# Patient Record
Sex: FEMALE
[De-identification: names, ages, dates, MRNs, and addresses within clinical notes are randomized per-mention and may not be internally consistent; named-entity substitution may affect disease eponyms.]

---

## 2019-06-06 ENCOUNTER — HOSPITAL ENCOUNTER (OUTPATIENT)
Dept: HOSPITAL 39 - RESP | Age: 69
End: 2019-06-06
Attending: FAMILY MEDICINE
Payer: COMMERCIAL

## 2019-06-06 DIAGNOSIS — R00.1: Primary | ICD-10-CM

## 2020-05-15 ENCOUNTER — HOSPITAL ENCOUNTER (OUTPATIENT)
Dept: HOSPITAL 39 - ER | Age: 70
Setting detail: OBSERVATION
LOS: 1 days | Discharge: HOME | End: 2020-05-16
Attending: NURSE PRACTITIONER | Admitting: NURSE PRACTITIONER
Payer: COMMERCIAL

## 2020-05-15 DIAGNOSIS — E11.9: ICD-10-CM

## 2020-05-15 DIAGNOSIS — Z79.82: ICD-10-CM

## 2020-05-15 DIAGNOSIS — Z83.3: ICD-10-CM

## 2020-05-15 DIAGNOSIS — Z60.2: ICD-10-CM

## 2020-05-15 DIAGNOSIS — Z80.0: ICD-10-CM

## 2020-05-15 DIAGNOSIS — R07.89: Primary | ICD-10-CM

## 2020-05-15 DIAGNOSIS — Z82.49: ICD-10-CM

## 2020-05-15 DIAGNOSIS — I70.0: ICD-10-CM

## 2020-05-15 DIAGNOSIS — Z82.3: ICD-10-CM

## 2020-05-15 DIAGNOSIS — Z79.899: ICD-10-CM

## 2020-05-15 DIAGNOSIS — I10: ICD-10-CM

## 2020-05-15 DIAGNOSIS — Z79.84: ICD-10-CM

## 2020-05-15 DIAGNOSIS — Z96.651: ICD-10-CM

## 2020-05-15 DIAGNOSIS — R79.89: ICD-10-CM

## 2020-05-15 DIAGNOSIS — E66.9: ICD-10-CM

## 2020-05-15 DIAGNOSIS — Z90.49: ICD-10-CM

## 2020-05-15 DIAGNOSIS — I49.3: ICD-10-CM

## 2020-05-15 DIAGNOSIS — F41.9: ICD-10-CM

## 2020-05-15 DIAGNOSIS — I25.10: ICD-10-CM

## 2020-05-15 DIAGNOSIS — F32.9: ICD-10-CM

## 2020-05-15 PROCEDURE — 80053 COMPREHEN METABOLIC PANEL: CPT

## 2020-05-15 PROCEDURE — 71275 CT ANGIOGRAPHY CHEST: CPT

## 2020-05-15 PROCEDURE — 71045 X-RAY EXAM CHEST 1 VIEW: CPT

## 2020-05-15 PROCEDURE — 96372 THER/PROPH/DIAG INJ SC/IM: CPT

## 2020-05-15 PROCEDURE — 99285 EMERGENCY DEPT VISIT HI MDM: CPT

## 2020-05-15 PROCEDURE — 82948 REAGENT STRIP/BLOOD GLUCOSE: CPT

## 2020-05-15 PROCEDURE — 84484 ASSAY OF TROPONIN QUANT: CPT

## 2020-05-15 PROCEDURE — 96374 THER/PROPH/DIAG INJ IV PUSH: CPT

## 2020-05-15 PROCEDURE — 94762 N-INVAS EAR/PLS OXIMTRY CONT: CPT

## 2020-05-15 PROCEDURE — 85379 FIBRIN DEGRADATION QUANT: CPT

## 2020-05-15 PROCEDURE — 80061 LIPID PANEL: CPT

## 2020-05-15 PROCEDURE — 36416 COLLJ CAPILLARY BLOOD SPEC: CPT

## 2020-05-15 PROCEDURE — 94760 N-INVAS EAR/PLS OXIMETRY 1: CPT

## 2020-05-15 PROCEDURE — 36415 COLL VENOUS BLD VENIPUNCTURE: CPT

## 2020-05-15 PROCEDURE — 93005 ELECTROCARDIOGRAM TRACING: CPT

## 2020-05-15 PROCEDURE — 83880 ASSAY OF NATRIURETIC PEPTIDE: CPT

## 2020-05-15 PROCEDURE — 85025 COMPLETE CBC W/AUTO DIFF WBC: CPT

## 2020-05-15 SDOH — SOCIAL STABILITY - SOCIAL INSECURITY: PROBLEMS RELATED TO LIVING ALONE: Z60.2

## 2020-05-15 NOTE — CT
PROCEDURE:  CTA Chest



CLINICAL HISTORY:  70 years  Female  rule out pulmonary embolism 



COMPARISON:  None.



TECHNIQUE: Contiguous axial images were obtained through the

chest during the infusion of IV contrast. Reformatted images

obtained. MIP reformatted images obtained.   



This exam was performed according to our department optimization

program which includes automated exposure control, adjustment of

the mA and/or kv according to patient size and/or use of

iterative reconstruction technique.



FINDINGS:



Aorta is calcified.

Mild cardiac enlargement.

Calcification of the coronary arteries.

No evidence of aortic dissection or pulmonary embolus.

Fatty liver.

No significant thoracic adenopathy. Focal areas of mosaic

attenuation are noted.



IMPRESSION:No evidence of pulmonary embolus



Cardiac enlargement



No evidence of acute process in the chest



Fatty liver



Electronically signed by:  Barbara Leavitt MD  5/15/2020 6:39 PM

CDT Workstation: [id number]

## 2020-05-15 NOTE — RAD
EXAM DESCRIPTION:  Chest,1 View



CLINICAL HISTORY:  70 years  Female  Chest pain



COMPARISON:  11/19/2019.



FINDINGS: 



The cardiomediastinal silhouette appears unremarkable.

Atherosclerotic calcifications in the thoracic aorta.  

Mild elevation of the right hemidiaphragm.

No consolidating infiltrates or pleural effusions.

No pneumothorax.



IMPRESSION: 



No acute abnormality is identified.



Electronically signed by:  Bradley Leavitt MD  5/15/2020 5:32 PM CDT

Workstation: [id number]

## 2020-05-15 NOTE — ED.PDOC
History of Present Illness





- General


Time Seen by Provider: 05/15/20 16:53


Additional Information: 


This is a 70-year-old female, with a diabetes obesity history of coronary artery

disease has had a stress test less than a year, patient presents to the ER 

because of a two-week history of chest tightness, pain comes and goes but is 

getting progressively worse even at rest, patient said that it feels like is 

going to her neck and today she felt dizzy.  No nausea no vomiting no 

diaphoresis no shortness of breath, patient mentioned that when she takes a deep

breath he feels like it hurts, patient was supposed to see a cardiologist have 

been trying to make an appointment with his cardiologist is in [place] but was 

unsuccessful in getting an appointment, because she got dizzy she decided to 

come in and get herself checked





- History of Present Illness


Timing/Duration: other - 2 weeks 


Location: central


Chest Pain Radiation: neck


Activities at Onset: none


Prior Chest Pain/Cardiac Workup: cardiac cath, stress test


Improving Factors: nothing


Worsening Factors: nothing


Aspirin Treatment Today: 81 mg x 2


Associated Symptoms: dizziness


Allergies/Adverse Reactions: 


Allergies





NO KNOWN ALLERGY Allergy (Verified 05/15/20 17:03)


   





Home Medications: 


Ambulatory Orders





Amlodipine Besylate 5 mg PO DAILY 05/15/20 


Aspirin [Aspirin Adult Low Dose] 162 mg PO DAILY 05/15/20 


Hydroxyzine HCl 25 mg PO TID PRN 05/15/20 


Lisinopril 40 mg PO DAILY 05/15/20 


Metformin HCl [Glucophage] 1,000 mg PO BID 05/15/20 


Omeprazole 40 mg PO DAILY 05/15/20 


Pravastatin Sodium 40 mg PO DAILY 05/15/20 


Verapamil HCl [Verapamil Hydrochloride] 120 mg PO DAILY 05/15/20 











Review of Systems





- Review of Systems


Constitutional: States: no symptoms reported


Respiratory: States: no symptoms reported


Cardiology: States: chest pain


Gastrointestinal/Abdominal: States: no symptoms reported


Genitourinary: States: no symptoms reported


Musculoskeletal: States: no symptoms reported


Skin: States: no symptoms reported


Neurological: States: no symptoms reported


Endocrine: States: no symptoms reported


Hematologic/Lymphatic: States: no symptoms reported





Family Medical History





- Family History


  ** Mother


Family History: Unknown


Living Status: Unknown





Physical Exam





- Physical Exam


General Appearance: Alert, Well Developed, Well Groomed, Well Hydrated, Well 

Nourished, Other - Obese


Eyes, Ears, Nose, Throat Exam: PERRL/EOMI, normal ENT inspection, TMs normal


Neck: non-tender, full range of motion, supple, normal inspection


Respiratory: chest non-tender, lungs clear, normal breath sounds, no respiratory

distress, no accessory muscle use


Cardiovascular/Chest: normal peripheral pulses, regular rate, rhythm, no edema, 

no gallop, no JVD, no murmur


Gastrointestinal/Abdominal: normal bowel sounds, non tender, soft, no 

organomegaly, no pulsatile mass, abnormal bowel sounds


Extremity: normal range of motion, non-tender, normal inspection, no pedal 

edema, no calf tenderness, normal capillary refill


Neurologic: CNs II-XII nml as tested, no motor/sensory deficits, alert, normal 

mood/affect, oriented x 3


Skin Exam: normal color





Progress





- Progress


Progress: 


EXAM DESCRIPTION: Chest,1 View  CLINICAL HISTORY: 70 years Female Chest pain  

COMPARISON: 11/19/2019.  FINDINGS:  The cardiomediastinal silhouette appears 

unremarkable. Atherosclerotic calcifications in the thoracic aorta. Mild 

elevation of the right hemidiaphragm. No consolidating infiltrates or pleural 

effusions. No pneumothorax.  IMPRESSION:  No acute abnormality is identified


05/15/20 18:15





05/15/20 18:44


FINDINGS:  Aorta is calcified. Mild cardiac enlargement. Calcification of the 

coronary arteries. No evidence of aortic dissection or pulmonary embolus. Fatty 

liver. No significant thoracic adenopathy. Focal areas of mosaic attenuation are

noted.  IMPRESSION:No evidence of pulmonary embolus  Cardiac enlargement  No 

evidence of acute process in the chest








This is a 70-year-old female, presenting with chest pain, has been going on for 

a week getting progressively worse patient EKG did not show any acute ischemic 

changes, patient chest pain got better with aspirin and nitro first set of 

troponins , Second set of troponins were also negative, CTA was ordered because 

her D-dimers were elevated and CTA showed calcification of the coronaries 

without evidence of pneumonia or evidence of pulmonary embolism


I spoke with the cardiologist Dr. Hunter and patient can stay in the hospital 

for observation and and also patient should be discharged tomorrow with some 

sort of a beta-blocker due to her blood pressure and some nitro for the 

treatment of angina, and the patient should follow-up with her own cardiologist 

once she is discharged





- Results/Orders


Results/Orders: 





Patient EKG showed PVC normal sinus rhythm no ST elevation or depression left 

atrial enlargement left ventricular hypertrophy





Departure





- Departure


Clinical Impression: 


Chest pain


Qualifiers:


 Chest pain type: unspecified Qualified Code(s): R07.9 - Chest pain, unspecified





Disposition: Admit Patient


Referrals: 


JACINTO DOMINGUEZ [Primary Care Provider] - 1-2 Weeks


Home Medications: 


Ambulatory Orders





Amlodipine Besylate 5 mg PO DAILY 05/15/20 


Aspirin [Aspirin Adult Low Dose] 162 mg PO DAILY 05/15/20 


Hydroxyzine HCl 25 mg PO TID PRN 05/15/20 


Lisinopril 40 mg PO DAILY 05/15/20 


Metformin HCl [Glucophage] 1,000 mg PO BID 05/15/20 


Omeprazole 40 mg PO DAILY 05/15/20 


Pravastatin Sodium 40 mg PO DAILY 05/15/20 


Verapamil HCl [Verapamil Hydrochloride] 120 mg PO DAILY 05/15/20 











Decision To Admit





- Decistion To Admit


Decision to Admit Reason: Admit from ER


Decision to Admit Date: 05/15/20


Decision to Admit Time: 19:03

## 2020-05-16 VITALS — TEMPERATURE: 98.2 F | OXYGEN SATURATION: 97 % | SYSTOLIC BLOOD PRESSURE: 105 MMHG | DIASTOLIC BLOOD PRESSURE: 69 MMHG

## 2020-05-16 RX ADMIN — ACETAMINOPHEN PRN MG: 325 TABLET ORAL at 01:05

## 2020-05-16 RX ADMIN — LISINOPRIL SCH MG: 10 TABLET ORAL at 09:34

## 2020-05-16 RX ADMIN — LISINOPRIL SCH: 10 TABLET ORAL at 09:54

## 2020-05-16 RX ADMIN — INSULIN LISPRO SCH: 100 INJECTION, SOLUTION INTRAVENOUS; SUBCUTANEOUS at 07:36

## 2020-05-16 RX ADMIN — INSULIN LISPRO SCH: 100 INJECTION, SOLUTION INTRAVENOUS; SUBCUTANEOUS at 11:21

## 2020-05-16 RX ADMIN — ACETAMINOPHEN PRN MG: 325 TABLET ORAL at 12:51

## 2020-05-16 NOTE — SSS
SUPERVISING PHYSICIAN:    Michael Kathleen MD



CHIEF COMPLAINT:  Chest pain.



HISTORY OF PRESENT ILLNESS:   Ms. Ball is a 70 year-old  female with a 
history of diabetes, obesity, coronary artery disease.  She presented to the 
Emergency Room complaining of chest tightness that has been going on for the 
last two weeks and has progressively worsened she notes at rest.  She denied any
nausea or vomiting or shortness of breath or diaphoresis. She notes it does hurt
a little bit when she takes a deep breath.  She was supposed to see her 
cardiologist the first of the month but due to the COVID-19 situation did not 
make that appointment.  She does see Dr. Torres in [place] and apparently has 
had a known cath and stress test workup but needs additional intervention at 
some point.  I believe she is supposed to see her cardiologist on May 26.  She 
noted at home she had been getting a little dizzy and at that point decided to 
come to the Emergency Room for evaluation.  She did take 2 aspirin before she 
got to the Emergency Room.  Cardiac workup in the Emergency Room did show her 
cardiac enzymes were negative times 2 sets.  She did have an elevated D-dimer at
887.  CT of the chest was completed which was negative for a pulmonary embolus 
or any other acute process noted in the chest per radiology interpretation.  She
denied any falls, shortness of breath or tachycardia but does have a fairly 
large bruise on her upper right arm with some ecchymoses from an event that she 
is not sure if she bumped her arm on something in the last week but denies any 
falls.  Her EKG in the Emergency Room showed a sinus rhythm with a PVC but no ST
elevation or depression.  There was some note of left atrial enlargement and 
some ventricle hypertrophy, otherwise no acute findings to indicate ischemia or 
injury pattern.  Her pain actually was resolved in the Emergency Room prior to 
request for placement in observation.  Based on the patient's history and 
concerns for acute coronary syndrome, the Emergency Room physician contacted 
cardiologist, Dr. Hunter, who recommended the patient be placed in observation 
overnight with blood pressure medicine which after reviewing her medications 
notes she is on multiple medications including Verapamil.  She was placed in 
observation in stable condition for further evaluation and cardiac telemetry 



PAST MEDICAL HISTORY: 

1.  Hypertension.

2.  Diabetes mellitus type 2.

3.  Depression and anxiety.

4.  Coronary artery disease.



PAST SURGICAL HISTORY: 

1.  Right knee replacement.

2.  Cholecystectomy.



CURRENT MEDICATIONS: 

1.  Metformin 1000 mg b.i.d.

2.  Lisinopril 40 mg daily.

3.  Pravastatin 40 mg at bedtime.

4.  Aspirin 162 mg daily.

5.  Hydroxyzine 25 mg t.i.d. as needed.

6.  Verapamil extended release 120 mg at bedtime.

7.  Amlodipine 5 mg at bedtime.

8.  Omeprazole 40 mg daily.



ALLERGIES:    No known drug allergies.



FAMILY HISTORY:  Mother  in her late 80s with history of diabetes, 
hypertension and a stroke.  Father  in his late 60s, his health history 
is unknown to the patient.  She has 8 brothers and sisters, one sister passed 
away in her 50s with a history of colon cancer, diabetes mellitus and 
cardiovascular disease.  The rest of her brothers and sisters have diabetes but 
are fairly healthy.



SOCIAL HISTORY:  The patient resides in [place], [place] and is disabled, 
 and lives alone.  She denies any history of alcohol or tobacco abuse 
and does not use any illicit drugs.



REVIEW OF SYSTEMS: 

CONSTITUTIONAL:  Denies general malaise, fevers, chills, weakness. 

HEENT:  Denies headaches. vision changes, sore throat. nasal congestion, 
earaches.

CHEST:   Denies shortness of breath, orthopnea, wheezing or coughing.

HEART:   As noted in history of present illness, chest tightness, denies 
palpitations, tachycardia or syncopal episodes.  

ABDOMEN:   Denies nausea, vomiting, diarrhea or constipation. 

GENITOURINARY:  Denies dysuria, hematuria or polyuria.

MUSCULOSKELETAL:  Denies arthralgias, joint swelling.

SKIN:  Denies lesions, rashes, moles, other than the bruising on her right upper
forearm. 

NEUROLOGIC:  Denies ataxia, seizures, or other focal deficits.

HEMATOLOGICAL:   Denies unexplained bleeding, easy bruising or transfusion 
reactions.



PHYSICAL EXAMINATION: 



VITAL SIGNS:  On admission to the Emergency Room, temperature 99.3, pulse 89, 
blood pressure 180/85, respirations 20, oxygen saturation 95% on room air.  At 
time of discharge, vital signs showed a temperature of 98.2, pulse 70, blood 
pressure 105/69, respirations 18, oxygen saturation 97%on room air.  Discharge 
weight 104.1 kg.



GENERAL:  The patient was resting comfortably, did not appear to be in acute 
distress.  She is alert, appears well-nourished, well-hydrated.



HEENT:  Tympanic membranes are clear bilaterally.  Oropharynx is pink, moist, 
without any lesions.



NECK:  Supple, non-tender, full range of motion, no jugular venous distention.



CHEST:  Lung sounds were clear to auscultation bilaterally without rhonchi, 
rales, or wheezes.



CARDIOVASCULAR:   Regular rate and rhythm without appreciable murmurs, rubs, or 
gallops.



ABDOMEN:   Obese, soft, non-tender, positive bowel sounds.



EXTREMITIES:  Without cyanosis, clubbing, or edema.



NEUROLOGIC: Cranial nerves II through XII are grossly intact.  She is alert and 
oriented x3.  Facial features were symmetrical.  Extraocular movements within 
normal limits.  There was no notable nystagmus.



SKIN:  Warm, pink and dry.



LABORATORY:  CBC showed to be within normal limits with white count of 7,500.  
Hemoglobin 12.8, hematocrit 38.0, platelet count 293,000.  Coagulation studies 
showed a D-dimer of 887.  Chemistries showed normal electrolytes, normal lipid 
panel.  She had 3 sets of troponins, all less than 0.02.  Blood sugars ranged 
between 102 and 180.  EKG showed a normal sinus rhythm with a few PVCs, no ST or
T-wave changes to indicate acute ischemia or acute coronary syndrome. 



RADIOLOGY:  Chest x-ray per radiology interpretation showed acute abnormalities 
identified.  Given her elevated D-dimer and symptomatology, she had a CTA of the
chest and per radiology interpretation showed no evidence of pulmonary embolus. 
Cardiac silhouette was enlarged.  There was no evidence of acute process of the 
chest. There was note of a fatty liver.



ADMISSION DIAGNOSES:

1.  Chest pain, observation for rule out.

2.  Elevated D-dimer with negative CTA of the chest. No indication of 

     acute embolus, probably due to recent traumatic injury to her right

     upper arm with bruising.

3.  Hypertension, poorly controlled.

4.  Diabetes mellitus type 2.

5.  Depression and anxiety.



DISCHARGE DIAGNOSES:

1.  Chest pain with no acute findings on workup including negative 

     troponins, EKG with no acute changes with patient scheduled to 

     followup with her cardiologist, Dr. Edmonds, already on multiple medications.

2.  Hypertension controlled on multiple medications.

3.  Diabetes mellitus type 2.

4.  Depression and anxiety with some anxiety possibly resulting in patient's

     symptomatology as listed in #1.



HOSPITAL COURSE:  Ms. Ball was admitted for chest pain rule out.  She was 
actually pain-free prior to admission to the medical/surgical floor.  Given her 
blood pressure and chest pain and history, I went ahead and started her on nitro
patch 0.4.  She was already on verapamil, lisinopril and other medications 
including a statin.  Her EKG showed a sinus rhythm with no acute injury pattern.
 She remained on telemetry.  She had no abnormal rhythms.  Clinically, she 
remained pain-free through the observation period and had normalization of her 
blood pressure and it was felt that she was showing to be clinically stable to 
discharge and followup with her cardiologist in the following week as scheduled.



ASSESSMENT/DISCHARGE:  Please see above.



PLAN:  Ms. Ball was discharged after chest pain rule out.  No interventions 
were required to resolve her chest pain, although she remained on a nitro patch 
through the hospitalization.  She was given aspirin.  She is to followup with 
her cardiologist which was already scheduled for May 26 with Dr. Torres in 
[place].  IT was recommended she call Dr. Lebron on Monday after discharge to 
establish appointment for followup with Dr. Lebron as well to continue 
followup with her cardiologist.  At some point, more than likely she will need a
stress test.  I have resumed her home medication prior to hospitalization with 
no additional medications added on discharge.  Diet was diabetic diet as 
tolerated.  Activities as tolerated.  Increase as per recommendations of Dr. Lebron and Dr. Torres. 



DISPOSITION:  Patient was discharged home.

CONDITION ON DISCHARGE:  Stable and improved.



#[id number]

[facility]

## 2020-07-23 ENCOUNTER — HOSPITAL ENCOUNTER (OUTPATIENT)
Dept: HOSPITAL 39 - RAD | Age: 70
End: 2020-07-23
Attending: FAMILY MEDICINE
Payer: COMMERCIAL

## 2020-07-23 DIAGNOSIS — M18.12: ICD-10-CM

## 2020-07-23 DIAGNOSIS — M25.9: Primary | ICD-10-CM

## 2020-07-24 NOTE — RAD
EXAM DESCRIPTION: Wrist,Left 3 Views



CLINICAL HISTORY: 70 years, Female, M25.532  PAIN IN LEFT WRIST



COMPARISON: None



FINDINGS: 

Left wrist 3 x-ray views is negative for fracture or dislocation.

Advanced degenerative osteoarthrosis of the first carpometacarpal

joint with spurring and degenerative separation of bases of the

first and second metacarpals. Metal lungs deformity configuration

of the distal radius could be related to old trauma or could be

congenital. Dislocated appearance of the distal ulna relative to

the radius with widened radial ulnar joint is also likely

congenital or posttraumatic. Thickened appearance of the soft

tissues overlying the dorsal more than ventral aspect of the

radius on the lateral view. Clinical correlation recommended as

to whether this is a chronic finding or acute swelling.



IMPRESSION:



Deformity of the wrist may be chronic posttraumatic or congenital

abnormality (metal lungs deformity).



Advanced degenerative osteoarthrosis of the first carpometacarpal

joint.



Electronically signed by:  Prateek Castillo MD  7/24/2020 9:15

AM CDT Workstation: [id number]